# Patient Record
Sex: FEMALE | URBAN - METROPOLITAN AREA
[De-identification: names, ages, dates, MRNs, and addresses within clinical notes are randomized per-mention and may not be internally consistent; named-entity substitution may affect disease eponyms.]

---

## 2018-01-31 ENCOUNTER — APPOINTMENT (RX ONLY)
Dept: URBAN - METROPOLITAN AREA CLINIC 310 | Facility: CLINIC | Age: 48
Setting detail: DERMATOLOGY
End: 2018-01-31

## 2018-01-31 DIAGNOSIS — D22 MELANOCYTIC NEVI: ICD-10-CM

## 2018-01-31 DIAGNOSIS — L72.8 OTHER FOLLICULAR CYSTS OF THE SKIN AND SUBCUTANEOUS TISSUE: ICD-10-CM

## 2018-01-31 DIAGNOSIS — Z80.8 FAMILY HISTORY OF MALIGNANT NEOPLASM OF OTHER ORGANS OR SYSTEMS: ICD-10-CM

## 2018-01-31 DIAGNOSIS — L20.89 OTHER ATOPIC DERMATITIS: ICD-10-CM

## 2018-01-31 PROBLEM — D48.5 NEOPLASM OF UNCERTAIN BEHAVIOR OF SKIN: Status: ACTIVE | Noted: 2018-01-31

## 2018-01-31 PROBLEM — L20.84 INTRINSIC (ALLERGIC) ECZEMA: Status: ACTIVE | Noted: 2018-01-31

## 2018-01-31 PROBLEM — D22.39 MELANOCYTIC NEVI OF OTHER PARTS OF FACE: Status: ACTIVE | Noted: 2018-01-31

## 2018-01-31 PROBLEM — D23.72 OTHER BENIGN NEOPLASM OF SKIN OF LEFT LOWER LIMB, INCLUDING HIP: Status: ACTIVE | Noted: 2018-01-31

## 2018-01-31 PROCEDURE — ? DEFER

## 2018-01-31 PROCEDURE — ? OTHER

## 2018-01-31 PROCEDURE — ? COUNSELING

## 2018-01-31 PROCEDURE — 11100: CPT

## 2018-01-31 PROCEDURE — 99202 OFFICE O/P NEW SF 15 MIN: CPT | Mod: 25

## 2018-01-31 PROCEDURE — ? PRESCRIPTION

## 2018-01-31 PROCEDURE — ? BIOPSY BY SHAVE METHOD

## 2018-01-31 RX ORDER — TRIAMCINOLONE ACETONIDE 1 MG/G
CREAM TOPICAL
Qty: 1 | Refills: 2 | Status: ERX | COMMUNITY
Start: 2018-01-31

## 2018-01-31 RX ADMIN — TRIAMCINOLONE ACETONIDE: 1 CREAM TOPICAL at 16:01

## 2018-01-31 ASSESSMENT — LOCATION DETAILED DESCRIPTION DERM
LOCATION DETAILED: RIGHT INFERIOR CENTRAL MALAR CHEEK
LOCATION DETAILED: RIGHT SUBMANDIBULAR AREA
LOCATION DETAILED: RIGHT DISTAL PRETIBIAL REGION

## 2018-01-31 ASSESSMENT — LOCATION SIMPLE DESCRIPTION DERM
LOCATION SIMPLE: RIGHT CHEEK
LOCATION SIMPLE: RIGHT PRETIBIAL REGION
LOCATION SIMPLE: RIGHT SUBMANDIBULAR AREA

## 2018-01-31 ASSESSMENT — LOCATION ZONE DERM
LOCATION ZONE: FACE
LOCATION ZONE: LEG

## 2018-01-31 NOTE — PROCEDURE: OTHER
Detail Level: Detailed
Note Text (......Xxx Chief Complaint.): This diagnosis correlates with the
Other (Free Text): mom; encouraged FSE in next month

## 2024-02-09 NOTE — PROCEDURE: BIOPSY BY SHAVE METHOD
Pharmacist Admission Medication History    Admission medication history is complete. The information provided in this note is only as accurate as the sources available at the time of the update.    Information Source(s): Patient, Family member, [facility], and pill box  via in-person. Spoke to patient using Wallisian     Pertinent Information:   -Son brought in pill box that patient fills herself. Pill box contained allopurinol 100mg x2, metformin 500mg x1, lisinopril 20mg x1, hydrochlorothiazide 12.5mg x1, ASA 81mg x1 vit D x1 for each day.  -hydrochlorothiazide last fill [facility] 9/7/23 #90. Metformin last fill 12/26/23 #30. Atorvastatin was not in pill box & last fill was 5/18/23 #90.  -Patient reported taking metoprolol and son thought current also - last fill metoprolol succinate 25mg 12/22/23 #270 for 90 days but metoprolol not in pill box. Son questioned patient and she indicated that maybe she forgot to put it in the pill box.    Changes made to PTA medication list:  Added: allopurinol, vit D, hydrochlorothiazide, lisinopril  Deleted: vitamins no longer taking  Changed: metformin from 500mg BID to 500mg daily    Medication History Completed By: Cinthia Augustine [facility] 2/9/2024 1:44 PM    Prior to Admission medications    Medication Sig Last Dose Taking? Auth Provider Long Term End Date   ACETAMINOPHEN PO Take 325 mg by mouth every 4 hours as needed for pain  Yes Reported, Patient     allopurinol (ZYLOPRIM) 100 MG tablet Take 200 mg by mouth daily  Yes Unknown, Entered By History     ASPIRIN PO Take 81 mg by mouth daily  Yes Reported, Patient     hydroCHLOROthiazide (HYDRODIURIL) 12.5 MG tablet Take 12.5 mg by mouth daily  at Last fill 9/7/23 #90 but still in pill box Yes Unknown, Entered By History Yes    IBUPROFEN PO Take 200 mg by mouth every 6 hours as needed for moderate pain  Yes Reported, Patient     lisinopril (ZESTRIL) 20 MG tablet Take 20 mg by mouth daily  Yes Unknown,  Entered By History Yes    metFORMIN (GLUCOPHAGE) 500 MG tablet Take 500 mg by mouth daily  Yes Unknown, Entered By History Yes    Omega 3-6-9 Fatty Acids (OMEGA-3 & OMEGA-6 FISH OIL PO) Take by mouth daily Takes sometimes Yes Reported, Patient     VITAMIN D, CHOLECALCIFEROL, PO Take by mouth daily  Yes Unknown, Entered By History     Atorvastatin Calcium (LIPITOR PO) Take 40 mg by mouth daily Not Taking at Not in pill box, last fill 5/18/23 #90  Reported, Patient Yes    desonide (DESOWEN) 0.05 % ointment Apply topically 2 times daily Unknown  Reported, Patient     hydrocortisone (ANUSOL-HC) 2.5 % cream Place rectally 2 times daily as needed for hemorrhoids Unknown  Reported, Patient     metoprolol succinate ER (TOPROL XL) 25 MG 24 hr tablet Take 50 mg by mouth every morning Unknown at Not in pill box but last fill 12/22/23 #270 for 90 days  Unknown, Entered By History Yes    metoprolol succinate ER (TOPROL XL) 25 MG 24 hr tablet Take 25 mg by mouth every evening Unknown at Not in pill box but last fill 12/22/23 #270 for 90 days  Unknown, Entered By History Yes          Bill For Surgical Tray: no